# Patient Record
Sex: FEMALE | Race: WHITE | NOT HISPANIC OR LATINO | Employment: OTHER | URBAN - METROPOLITAN AREA
[De-identification: names, ages, dates, MRNs, and addresses within clinical notes are randomized per-mention and may not be internally consistent; named-entity substitution may affect disease eponyms.]

---

## 2017-03-29 ENCOUNTER — HOSPITAL ENCOUNTER (OUTPATIENT)
Dept: LAB | Facility: MEDICAL CENTER | Age: 77
End: 2017-03-29
Attending: FAMILY MEDICINE
Payer: COMMERCIAL

## 2017-03-29 PROCEDURE — 80053 COMPREHEN METABOLIC PANEL: CPT

## 2017-03-29 PROCEDURE — 85025 COMPLETE CBC W/AUTO DIFF WBC: CPT

## 2017-03-29 PROCEDURE — 82306 VITAMIN D 25 HYDROXY: CPT

## 2017-03-29 PROCEDURE — 84480 ASSAY TRIIODOTHYRONINE (T3): CPT

## 2017-03-29 PROCEDURE — 36415 COLL VENOUS BLD VENIPUNCTURE: CPT

## 2017-03-29 PROCEDURE — 84436 ASSAY OF TOTAL THYROXINE: CPT

## 2017-03-29 PROCEDURE — 84443 ASSAY THYROID STIM HORMONE: CPT

## 2017-03-29 PROCEDURE — 80061 LIPID PANEL: CPT

## 2017-03-30 LAB
25(OH)D3 SERPL-MCNC: 99 NG/ML (ref 30–100)
ALBUMIN SERPL BCP-MCNC: 3.8 G/DL (ref 3.2–4.9)
ALBUMIN/GLOB SERPL: 1.2 G/DL
ALP SERPL-CCNC: 70 U/L (ref 30–99)
ALT SERPL-CCNC: 17 U/L (ref 2–50)
ANION GAP SERPL CALC-SCNC: 10 MMOL/L (ref 0–11.9)
AST SERPL-CCNC: 26 U/L (ref 12–45)
BASOPHILS # BLD AUTO: 0.07 K/UL (ref 0–0.12)
BASOPHILS NFR BLD AUTO: 1.1 % (ref 0–1.8)
BILIRUB SERPL-MCNC: 0.7 MG/DL (ref 0.1–1.5)
BUN SERPL-MCNC: 9 MG/DL (ref 8–22)
CALCIUM SERPL-MCNC: 9.5 MG/DL (ref 8.5–10.5)
CHLORIDE SERPL-SCNC: 102 MMOL/L (ref 96–112)
CHOLEST SERPL-MCNC: 134 MG/DL (ref 100–199)
CO2 SERPL-SCNC: 25 MMOL/L (ref 20–33)
CREAT SERPL-MCNC: 0.51 MG/DL (ref 0.5–1.4)
EOSINOPHIL # BLD: 0.15 K/UL (ref 0–0.51)
EOSINOPHIL NFR BLD AUTO: 2.4 % (ref 0–6.9)
ERYTHROCYTE [DISTWIDTH] IN BLOOD BY AUTOMATED COUNT: 46.3 FL (ref 35.9–50)
GLOBULIN SER CALC-MCNC: 3.2 G/DL (ref 1.9–3.5)
GLUCOSE SERPL-MCNC: 120 MG/DL (ref 65–99)
HCT VFR BLD AUTO: 46.1 % (ref 37–47)
HDLC SERPL-MCNC: 61 MG/DL
HGB BLD-MCNC: 15.5 G/DL (ref 12–16)
IMM GRANULOCYTES # BLD AUTO: 0.02 K/UL (ref 0–0.11)
IMM GRANULOCYTES NFR BLD AUTO: 0.3 % (ref 0–0.9)
LDLC SERPL CALC-MCNC: 53 MG/DL
LYMPHOCYTES # BLD: 2.2 K/UL (ref 1–4.8)
LYMPHOCYTES NFR BLD AUTO: 35.2 % (ref 22–41)
MCH RBC QN AUTO: 32.4 PG (ref 27–33)
MCHC RBC AUTO-ENTMCNC: 33.6 G/DL (ref 33.6–35)
MCV RBC AUTO: 96.2 FL (ref 81.4–97.8)
MONOCYTES # BLD: 0.71 K/UL (ref 0–0.85)
MONOCYTES NFR BLD AUTO: 11.4 % (ref 0–13.4)
NEUTROPHILS # BLD: 3.1 K/UL (ref 2–7.15)
NEUTROPHILS NFR BLD AUTO: 49.6 % (ref 44–72)
NRBC # BLD AUTO: 0 K/UL
NRBC BLD-RTO: 0 /100 WBC
PLATELET # BLD AUTO: 223 K/UL (ref 164–446)
PMV BLD AUTO: 11.8 FL (ref 9–12.9)
POTASSIUM SERPL-SCNC: 3.5 MMOL/L (ref 3.6–5.5)
PROT SERPL-MCNC: 7 G/DL (ref 6–8.2)
RBC # BLD AUTO: 4.79 M/UL (ref 4.2–5.4)
SODIUM SERPL-SCNC: 137 MMOL/L (ref 135–145)
T3 SERPL-MCNC: 99.9 NG/DL (ref 60–181)
T4 SERPL-MCNC: 10.4 UG/DL (ref 4–12)
TRIGL SERPL-MCNC: 101 MG/DL (ref 0–149)
TSH SERPL DL<=0.005 MIU/L-ACNC: 0.33 UIU/ML (ref 0.3–3.7)
WBC # BLD AUTO: 6.3 K/UL (ref 4.8–10.8)

## 2017-06-27 ENCOUNTER — HOSPITAL ENCOUNTER (OUTPATIENT)
Dept: LAB | Facility: MEDICAL CENTER | Age: 77
End: 2017-06-27
Attending: FAMILY MEDICINE
Payer: COMMERCIAL

## 2017-06-27 LAB
ALBUMIN SERPL BCP-MCNC: 3.7 G/DL (ref 3.2–4.9)
ALBUMIN/GLOB SERPL: 1.1 G/DL
ALP SERPL-CCNC: 54 U/L (ref 30–99)
ALT SERPL-CCNC: 16 U/L (ref 2–50)
ANION GAP SERPL CALC-SCNC: 9 MMOL/L (ref 0–11.9)
AST SERPL-CCNC: 23 U/L (ref 12–45)
BASOPHILS # BLD AUTO: 1.1 % (ref 0–1.8)
BASOPHILS # BLD: 0.07 K/UL (ref 0–0.12)
BILIRUB SERPL-MCNC: 0.9 MG/DL (ref 0.1–1.5)
BUN SERPL-MCNC: 9 MG/DL (ref 8–22)
CALCIUM SERPL-MCNC: 9.2 MG/DL (ref 8.5–10.5)
CHLORIDE SERPL-SCNC: 103 MMOL/L (ref 96–112)
CO2 SERPL-SCNC: 26 MMOL/L (ref 20–33)
CREAT SERPL-MCNC: 0.62 MG/DL (ref 0.5–1.4)
EOSINOPHIL # BLD AUTO: 0.41 K/UL (ref 0–0.51)
EOSINOPHIL NFR BLD: 6.2 % (ref 0–6.9)
ERYTHROCYTE [DISTWIDTH] IN BLOOD BY AUTOMATED COUNT: 48.5 FL (ref 35.9–50)
GFR SERPL CREATININE-BSD FRML MDRD: >60 ML/MIN/1.73 M 2
GLOBULIN SER CALC-MCNC: 3.4 G/DL (ref 1.9–3.5)
GLUCOSE SERPL-MCNC: 114 MG/DL (ref 65–99)
HCT VFR BLD AUTO: 45.9 % (ref 37–47)
HGB BLD-MCNC: 15.1 G/DL (ref 12–16)
IMM GRANULOCYTES # BLD AUTO: 0.01 K/UL (ref 0–0.11)
IMM GRANULOCYTES NFR BLD AUTO: 0.2 % (ref 0–0.9)
INR PPP: 1.13 (ref 0.87–1.13)
LYMPHOCYTES # BLD AUTO: 2.4 K/UL (ref 1–4.8)
LYMPHOCYTES NFR BLD: 36 % (ref 22–41)
MCH RBC QN AUTO: 31.9 PG (ref 27–33)
MCHC RBC AUTO-ENTMCNC: 32.9 G/DL (ref 33.6–35)
MCV RBC AUTO: 96.8 FL (ref 81.4–97.8)
MONOCYTES # BLD AUTO: 0.88 K/UL (ref 0–0.85)
MONOCYTES NFR BLD AUTO: 13.2 % (ref 0–13.4)
NEUTROPHILS # BLD AUTO: 2.89 K/UL (ref 2–7.15)
NEUTROPHILS NFR BLD: 43.3 % (ref 44–72)
NRBC # BLD AUTO: 0 K/UL
NRBC BLD AUTO-RTO: 0 /100 WBC
PLATELET # BLD AUTO: 181 K/UL (ref 164–446)
PMV BLD AUTO: 12.2 FL (ref 9–12.9)
POTASSIUM SERPL-SCNC: 3.8 MMOL/L (ref 3.6–5.5)
PROT SERPL-MCNC: 7.1 G/DL (ref 6–8.2)
PROTHROMBIN TIME: 14.9 SEC (ref 12–14.6)
RBC # BLD AUTO: 4.74 M/UL (ref 4.2–5.4)
SODIUM SERPL-SCNC: 138 MMOL/L (ref 135–145)
WBC # BLD AUTO: 6.7 K/UL (ref 4.8–10.8)

## 2017-06-27 PROCEDURE — 80053 COMPREHEN METABOLIC PANEL: CPT

## 2017-06-27 PROCEDURE — 36415 COLL VENOUS BLD VENIPUNCTURE: CPT

## 2017-06-27 PROCEDURE — 85025 COMPLETE CBC W/AUTO DIFF WBC: CPT

## 2017-06-27 PROCEDURE — 85610 PROTHROMBIN TIME: CPT | Mod: GA

## 2018-06-06 ENCOUNTER — HOSPITAL ENCOUNTER (OUTPATIENT)
Facility: MEDICAL CENTER | Age: 78
End: 2018-06-06
Attending: OPHTHALMOLOGY | Admitting: OPHTHALMOLOGY
Payer: COMMERCIAL

## 2018-06-06 VITALS
WEIGHT: 186.29 LBS | HEART RATE: 92 BPM | HEIGHT: 61 IN | BODY MASS INDEX: 35.17 KG/M2 | TEMPERATURE: 98 F | SYSTOLIC BLOOD PRESSURE: 144 MMHG | RESPIRATION RATE: 18 BRPM | DIASTOLIC BLOOD PRESSURE: 74 MMHG | OXYGEN SATURATION: 97 %

## 2018-06-06 LAB — EKG IMPRESSION: NORMAL

## 2018-06-06 PROCEDURE — 160036 HCHG PACU - EA ADDL 30 MINS PHASE I: Performed by: OPHTHALMOLOGY

## 2018-06-06 PROCEDURE — 93010 ELECTROCARDIOGRAM REPORT: CPT | Performed by: INTERNAL MEDICINE

## 2018-06-06 PROCEDURE — 160048 HCHG OR STATISTICAL LEVEL 1-5: Performed by: OPHTHALMOLOGY

## 2018-06-06 PROCEDURE — 99153 MOD SED SAME PHYS/QHP EA: CPT | Performed by: OPHTHALMOLOGY

## 2018-06-06 PROCEDURE — 93005 ELECTROCARDIOGRAM TRACING: CPT | Performed by: OPHTHALMOLOGY

## 2018-06-06 PROCEDURE — 700101 HCHG RX REV CODE 250

## 2018-06-06 PROCEDURE — 500855 HCHG NEEDLE, IRRIG CYSTOTOME 27G: Performed by: OPHTHALMOLOGY

## 2018-06-06 PROCEDURE — 501749 HCHG SHELL REV 276: Performed by: OPHTHALMOLOGY

## 2018-06-06 PROCEDURE — 502240 HCHG MISC OR SUPPLY RC 0272: Performed by: OPHTHALMOLOGY

## 2018-06-06 PROCEDURE — 160002 HCHG RECOVERY MINUTES (STAT): Performed by: OPHTHALMOLOGY

## 2018-06-06 PROCEDURE — 99152 MOD SED SAME PHYS/QHP 5/>YRS: CPT | Performed by: OPHTHALMOLOGY

## 2018-06-06 PROCEDURE — 500558 HCHG EYE SHIELD W/GARTER (FOX): Performed by: OPHTHALMOLOGY

## 2018-06-06 PROCEDURE — 160035 HCHG PACU - 1ST 60 MINS PHASE I: Performed by: OPHTHALMOLOGY

## 2018-06-06 PROCEDURE — 700111 HCHG RX REV CODE 636 W/ 250 OVERRIDE (IP)

## 2018-06-06 PROCEDURE — 501539 HCHG TIP, PHACO: Performed by: OPHTHALMOLOGY

## 2018-06-06 PROCEDURE — 160029 HCHG SURGERY MINUTES - 1ST 30 MINS LEVEL 4: Performed by: OPHTHALMOLOGY

## 2018-06-06 DEVICE — IMPLANTABLE DEVICE: Type: IMPLANTABLE DEVICE | Site: EYE | Status: FUNCTIONAL

## 2018-06-06 RX ORDER — PHENYLEPHRINE HYDROCHLORIDE 25 MG/ML
SOLUTION/ DROPS OPHTHALMIC
Status: COMPLETED
Start: 2018-06-06 | End: 2018-06-06

## 2018-06-06 RX ORDER — AMOXICILLIN 500 MG
CAPSULE ORAL
COMMUNITY

## 2018-06-06 RX ORDER — LEVOTHYROXINE SODIUM 112 UG/1
112 TABLET ORAL
COMMUNITY

## 2018-06-06 RX ORDER — MOXIFLOXACIN 5 MG/ML
SOLUTION/ DROPS OPHTHALMIC
Status: DISCONTINUED | OUTPATIENT
Start: 2018-06-06 | End: 2018-06-06 | Stop reason: HOSPADM

## 2018-06-06 RX ORDER — LANOLIN ALCOHOL/MO/W.PET/CERES
400 CREAM (GRAM) TOPICAL DAILY
COMMUNITY

## 2018-06-06 RX ORDER — ANASTROZOLE 1 MG/1
1 TABLET ORAL DAILY
COMMUNITY

## 2018-06-06 RX ORDER — FLURBIPROFEN SODIUM 0.3 MG/ML
SOLUTION/ DROPS OPHTHALMIC
Status: COMPLETED
Start: 2018-06-06 | End: 2018-06-06

## 2018-06-06 RX ORDER — CYCLOPENTOLATE HYDROCHLORIDE 10 MG/ML
SOLUTION/ DROPS OPHTHALMIC
Status: COMPLETED
Start: 2018-06-06 | End: 2018-06-06

## 2018-06-06 RX ORDER — LIDOCAINE HYDROCHLORIDE 10 MG/ML
INJECTION, SOLUTION EPIDURAL; INFILTRATION; INTRACAUDAL; PERINEURAL
Status: DISCONTINUED | OUTPATIENT
Start: 2018-06-06 | End: 2018-06-06 | Stop reason: HOSPADM

## 2018-06-06 RX ORDER — EZETIMIBE AND SIMVASTATIN 10; 20 MG/1; MG/1
1 TABLET ORAL DAILY
COMMUNITY

## 2018-06-06 RX ORDER — TETRACAINE HYDROCHLORIDE 5 MG/ML
SOLUTION OPHTHALMIC
Status: DISCONTINUED | OUTPATIENT
Start: 2018-06-06 | End: 2018-06-06 | Stop reason: HOSPADM

## 2018-06-06 RX ORDER — HYDROCHLOROTHIAZIDE 25 MG/1
25 TABLET ORAL DAILY
COMMUNITY

## 2018-06-06 RX ORDER — VENLAFAXINE 75 MG/1
75 TABLET ORAL DAILY
COMMUNITY

## 2018-06-06 RX ORDER — DIAZEPAM 10 MG/1
10 TABLET ORAL EVERY 6 HOURS PRN
COMMUNITY

## 2018-06-06 RX ORDER — SODIUM CHLORIDE, SODIUM LACTATE, POTASSIUM CHLORIDE, CALCIUM CHLORIDE 600; 310; 30; 20 MG/100ML; MG/100ML; MG/100ML; MG/100ML
INJECTION, SOLUTION INTRAVENOUS CONTINUOUS
Status: DISCONTINUED | OUTPATIENT
Start: 2018-06-06 | End: 2018-06-06 | Stop reason: HOSPADM

## 2018-06-06 RX ORDER — MOXIFLOXACIN 5 MG/ML
SOLUTION/ DROPS OPHTHALMIC
Status: COMPLETED
Start: 2018-06-06 | End: 2018-06-06

## 2018-06-06 RX ORDER — TETRACAINE HYDROCHLORIDE 5 MG/ML
SOLUTION OPHTHALMIC
Status: COMPLETED
Start: 2018-06-06 | End: 2018-06-06

## 2018-06-06 RX ORDER — POTASSIUM CHLORIDE 600 MG/1
8 CAPSULE, EXTENDED RELEASE ORAL DAILY
COMMUNITY

## 2018-06-06 RX ORDER — PROPRANOLOL HYDROCHLORIDE 20 MG/1
20 TABLET ORAL DAILY
COMMUNITY

## 2018-06-06 RX ORDER — METHION/INOS/CHOL BT/B COM/LIV 110MG-86MG
CAPSULE ORAL
COMMUNITY

## 2018-06-06 RX ORDER — CHOLECALCIFEROL (VITAMIN D3) 125 MCG
1000 CAPSULE ORAL DAILY
COMMUNITY

## 2018-06-06 RX ORDER — HYDROCODONE BITARTRATE AND ACETAMINOPHEN 10; 325 MG/1; MG/1
1-2 TABLET ORAL EVERY 6 HOURS PRN
COMMUNITY

## 2018-06-06 RX ADMIN — SODIUM CHLORIDE, SODIUM LACTATE, POTASSIUM CHLORIDE, CALCIUM CHLORIDE: 600; 310; 30; 20 INJECTION, SOLUTION INTRAVENOUS at 11:15

## 2018-06-06 RX ADMIN — PHENYLEPHRINE HYDROCHLORIDE 1 DROP: 2.5 SOLUTION/ DROPS OPHTHALMIC at 09:45

## 2018-06-06 RX ADMIN — FLURBIPROFEN SODIUM 1 DROP: 0.3 SOLUTION/ DROPS OPHTHALMIC at 09:45

## 2018-06-06 RX ADMIN — TETRACAINE HYDROCHLORIDE 1 DROP: 5 SOLUTION OPHTHALMIC at 10:00

## 2018-06-06 RX ADMIN — MOXIFLOXACIN HYDROCHLORIDE 1 DROP: 5 SOLUTION/ DROPS OPHTHALMIC at 09:45

## 2018-06-06 RX ADMIN — CYCLOPENTOLATE HYDROCHLORIDE 1 DROP: 10 SOLUTION/ DROPS OPHTHALMIC at 10:00

## 2018-06-06 ASSESSMENT — PAIN SCALES - GENERAL
PAINLEVEL_OUTOF10: 0

## 2018-06-06 NOTE — DISCHARGE INSTRUCTIONS
HOME CARE INSTRUCTIONS FOR CATARACT SURGERY    ACTIVITY: Rest and take it easy for the first 24 hours. We strongly suggest that a responsible adult remain with you during that time. It is normal to feel sleepy. We encourage you to not do anything that requires balance, judgment or coordination. Be extra careful when walking (with a dilated eye, it is easier to trip and fall).     FOR 24 HOURS, DO NOT:       Drive, operate machinery or run household appliances.        Drink beer or alcoholic beverages.        Make important decisions or sign legal documents.     DIET: To avoid nausea, slowly advance diet as tolerated, avoiding spicy or greasy foods for the first meal.     MEDICATIONS: Resume taking daily medication. You may take Tylenol for mild discomfort, if needed.     SURGICAL DRESSING: Eye shield as instructed by your doctor. Dark glasses should be worn while in the sunlight.     Follow your Physician's instruction Sheet. Eye Kit Given    A follow-up appointment is scheduled with your doctor tomorrow at 9:00 AM.     You should call 911 if you develop problems with breathing or chest pain.  You should CALL YOUR PHYSICIAN if you develop: Sharp stabbing pain or sudden change in vision in your operative eye. If you are unable to contact your doctor or the surgical center, you should go to the nearest emergency room or urgent care center. Physician's telephone # [phone]    If any questions arise, call your doctor. If your doctor is not available, please feel free to call Same Day Surgery at [phone]. You can also call the [facility] Hotline open 24 hours/day, 7 days/week and speak to a nurse at [phone] or [phone].     I acknowledge receipt and understanding of these Home Care Instructions.    ______________________________     _______________________________            Signature of Patient / Responsible Adult                                                       RN Signature    A registered nurse may call  you a few days after your surgery to see how you are doing.   You may also receive a survey in the mail within the next two weeks and we ask that you take a few moments to complete and return the survey. Our goal is to provide you with very good care and we value your comments. Thank you for choosing [facility].

## 2018-06-06 NOTE — OR NURSING
1229 Arrives per cart to PACU, accompanied by OR staff. Patient is sleepy, O2 on per nasal cannula, monitors applied.  Handoff report received from anesthesia and OR nursing personnel.     1315 Up to bathroom with assist and voided. Settled in recliner and returned to bay. Friend in to see.  Patient denies pain, states has some scratchiness in R eye, shield intact.  Sipping water and soda without nausea.    1350 Discharge instructions reviewed with patient and her friend Leny. They verbalized understanding. Patient discharged per wheelchair with belongings and discharge instructions. Friend in accompaniment.

## 2018-06-06 NOTE — OP REPORT
"DATE OF SERVICE:  06/06/2018    PREOPERATIVE DIAGNOSIS:  Cataract, right eye.    POSTOPERATIVE DIAGNOSIS:  Cataract, right eye.    PROCEDURE PERFORMED:  Phacoemulsification with posterior chamber intraocular   lens implant, right eye.    ANESTHESIA:  Topical with monitored anesthesia care.    ANESTHESIOLOGIST:  Robby Linares MD    HISTORY:  This patient is a 77-year-old woman who complains that her cataract   in the right eye needs to come out.  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    She states that she cannot see small print at all.  She must use a hand   magnifier.  She cannot see the phone book.  She does not drive after dark   because everything has a lot of glare \"everything is blurry, object are not   clear.\"  Her pseudophakic eye she states is much better.  Best corrected   vision in the right eye is 20/30-1.  However, this is with approximately 2   diopters of secondary myopia due to the cataracts.  The lens is graded at 4+   nuclear sclerosis.  Near vision with correction is 20/30.  This is; however,   quite a dense cataract.  The patient has seen a DVD regarding the risks and   complications of cataract surgery.  She has signed informed consent and wishes   to proceed.    DESCRIPTION OF PROCEDURE:  The patient was brought to the pre-anesthesia area   after having taken nonsteroidal anti-inflammatory drops in the operative eye   for 3 days prior to surgery.  In the holding area, she received topical   dilating drops, flurbiprofen and Vigamox.  On call to the operating room, the   patient was taken into the operating suite, positioned on the table, prepped   and draped in the usual sterile manner for eye surgery on the right eye.    Sterile drapes were opened, a wirelid speculum was inserted; 1%   preservative-free lidocaine was applied to the cornea and anterior chamber   paracentesis was placed at the 12 o'clock limbus.  1% preservative-free   lidocaine and Viscoat were instilled into the anterior " chamber.  A temporal   clear corneal wound was created using a 3-mm keratome.  Capsulorrhexis,   hydrodissection, hydrodelineation were performed.  The nucleus was   phacoemulsified with a CDE of 22.76.  Cortical cleanup was completed.  Provisc   was used to polish posterior capsule and open the capsular bag.  A Snyder   capsule polisher was used to polish the anterior capsule.  An Alex AcrySof   model SN60WF power 22.0 diopter, 1 piece acrylic intraocular lens, serial   #[id number] was called for, inspected, placed into the injector, injected   into the capsular bag and positioned.  Anterior stromal hydration of the   wounds was performed.  Viscoelastic was aspirated.  Miostat was instilled into   the anterior chamber.  The wounds were checked and found to be watertight.    Sterile drapes were removed.  A 5% Betadine on a Weck-Dina was used to swab the   inferior and superior fornices and the lid margins.  A drop of Vigamox was   placed on the eye and a shield was placed over the eye.  The patient was taken   to the recovery room in good condition.  There were no operative   complications.       ____________________________________     MD MEERA ALCOCER / ANUM    DD:  06/06/2018 12:34:55  DT:  06/06/2018 13:27:07    D#:  [id number]  Job#:  [id number]

## 2018-09-10 ENCOUNTER — HOSPITAL ENCOUNTER (EMERGENCY)
Facility: MEDICAL CENTER | Age: 78
End: 2018-09-10
Attending: EMERGENCY MEDICINE
Payer: COMMERCIAL

## 2018-09-10 ENCOUNTER — APPOINTMENT (OUTPATIENT)
Dept: RADIOLOGY | Facility: MEDICAL CENTER | Age: 78
End: 2018-09-10
Attending: EMERGENCY MEDICINE
Payer: COMMERCIAL

## 2018-09-10 VITALS
RESPIRATION RATE: 18 BRPM | DIASTOLIC BLOOD PRESSURE: 60 MMHG | WEIGHT: 174.38 LBS | TEMPERATURE: 98.1 F | HEIGHT: 62 IN | SYSTOLIC BLOOD PRESSURE: 137 MMHG | HEART RATE: 85 BPM | OXYGEN SATURATION: 94 % | BODY MASS INDEX: 32.09 KG/M2

## 2018-09-10 DIAGNOSIS — S93.491S SPRAIN OF ANTERIOR TALOFIBULAR LIGAMENT OF RIGHT ANKLE, SEQUELA: ICD-10-CM

## 2018-09-10 PROCEDURE — 73610 X-RAY EXAM OF ANKLE: CPT | Mod: RT

## 2018-09-10 PROCEDURE — 73560 X-RAY EXAM OF KNEE 1 OR 2: CPT | Mod: LT

## 2018-09-10 PROCEDURE — 99284 EMERGENCY DEPT VISIT MOD MDM: CPT

## 2018-09-10 ASSESSMENT — PAIN SCALES - GENERAL: PAINLEVEL_OUTOF10: 8

## 2018-09-11 NOTE — ED NOTES
Assumed care of pt in wheelchair. X-ray s ordered per protocol. oce applied. Await erp. Lateral swelling noted to rt ankle. No bruising

## 2018-09-11 NOTE — ED NOTES
.Pt D/C to home. VSS. D/C instructions given to patient. Pt verbalizes understanding. Pt leaves ED with no acute changes, complaints or concerns. Pt out to waiting room via wheelchair and all belongings.

## 2018-09-11 NOTE — ED PROVIDER NOTES
"ED Provider Note    CHIEF COMPLAINT   Chief Complaint   Patient presents with   • Ankle Pain     hurt ankle while getting out of a recliner 3 hours ago.        HPI   Sarah Ferrell is a 77 y.o. female who presents with ankle pain.  She was getting out of a lounge chair in her legs got tied up she twisted her right ankle just prior to presentation no other injuries.    REVIEW OF SYSTEMS   See HPI for further details.      PAST MEDICAL HISTORY   Past Medical History:   Diagnosis Date   • Arthritis     knees   • Cancer (HCC)     breast   • Cataract    • Disorder of thyroid    • Hypertension    • Pain     knee arthritis       FAMILY HISTORY  History reviewed. No pertinent family history.    SOCIAL HISTORY  Social History     Social History   • Marital status:      Spouse name: N/A   • Number of children: N/A   • Years of education: N/A     Social History Main Topics   • Smoking status: Former Smoker     Packs/day: 0.50     Types: Cigarettes     Quit date: 6/6/1985   • Smokeless tobacco: Never Used   • Alcohol use Yes      Comment: 3 a month   • Drug use: No   • Sexual activity: Not on file     Other Topics Concern   • Not on file     Social History Narrative   • No narrative on file       SURGICAL HISTORY  Past Surgical History:   Procedure Laterality Date   • CATARACT PHACO WITH IOL Right 6/6/2018    Procedure: CATARACT PHACO WITH IOL;  Surgeon: Coco Ansari M.D.;  Location: SURGERY SAME DAY [facility];  Service: Ophthalmology       CURRENT MEDICATIONS   Home Medications    **Home medications have not yet been reviewed for this encounter**         ALLERGIES   No Known Allergies    PHYSICAL EXAM  VITAL SIGNS: Pulse 84   Temp 36.3 °C (97.3 °F)   Resp 16   Ht 1.575 m (5' 2\")   Wt 79.1 kg (174 lb 6.1 oz)   SpO2 90%   BMI 31.90 kg/m²   Constitutional: Patient is alert and oriented x3 in no distress   Cardiovascular: Normal heart rate, Normal rhythm, No murmurs, No rubs, No gallops.   Thorax & Lungs: " Normal breath sounds, No respiratory distress, No wheezing,   Skin: Warm, Dry, No erythema, No rash.   Extremities:, Swelling in the anterior tibial talar ligament area just anterior to the tip of the lateral malleolus she also has some distal fibular tenderness no proximal fibular tenderness no medial malleolar tenderness and no foot or heel tenderness  Neurologic: Normal motor sensation  Vascular: Good capillary refill      RADIOLOGY/PROCEDURES  DX-KNEE 2- LEFT   Final Result      1.  LEFT knee prosthesis in place.   2.  No fracture or dislocation.   3.  Diffuse anterior soft tissue swelling.      DX-ANKLE 3+ VIEWS RIGHT   Final Result      1.  Lateral swelling of RIGHT ankle.   2.  No fracture or dislocation.            COURSE & MEDICAL DECISION MAKING  Pertinent Labs & Imaging studies reviewed. (See chart for details)  Patient has ankle sprain.  She has crutches at home.  I am placing her in a sugar tong air splint and she will follow-up with her orthopedist tomorrow    FINAL IMPRESSION  Ankle sprain      2.   3.      Electronically signed by: James Cook, 9/10/2018 7:00 PM

## 2018-09-11 NOTE — ED NOTES
".  Chief Complaint   Patient presents with   • Ankle Pain     hurt ankle while getting out of a recliner 3 hours ago.      .Pulse 84   Temp 36.3 °C (97.3 °F)   Resp 16   Ht 1.575 m (5' 2\")   Wt 79.1 kg (174 lb 6.1 oz)   SpO2 90%   BMI 31.90 kg/m²     "

## 2019-12-02 ENCOUNTER — HOSPITAL ENCOUNTER (OUTPATIENT)
Dept: LAB | Facility: MEDICAL CENTER | Age: 79
End: 2019-12-02
Attending: INTERNAL MEDICINE
Payer: COMMERCIAL

## 2019-12-02 LAB
ANION GAP SERPL CALC-SCNC: 10 MMOL/L (ref 0–11.9)
BUN SERPL-MCNC: 10 MG/DL (ref 8–22)
CALCIUM SERPL-MCNC: 9.6 MG/DL (ref 8.5–10.5)
CHLORIDE SERPL-SCNC: 97 MMOL/L (ref 96–112)
CO2 SERPL-SCNC: 29 MMOL/L (ref 20–33)
CREAT SERPL-MCNC: 0.68 MG/DL (ref 0.5–1.4)
GLUCOSE SERPL-MCNC: 138 MG/DL (ref 65–99)
MAGNESIUM SERPL-MCNC: 1.7 MG/DL (ref 1.5–2.5)
PHOSPHATE SERPL-MCNC: 4.4 MG/DL (ref 2.5–4.5)
POTASSIUM SERPL-SCNC: 3.3 MMOL/L (ref 3.6–5.5)
SODIUM SERPL-SCNC: 136 MMOL/L (ref 135–145)

## 2019-12-02 PROCEDURE — 36415 COLL VENOUS BLD VENIPUNCTURE: CPT

## 2019-12-02 PROCEDURE — 80048 BASIC METABOLIC PNL TOTAL CA: CPT

## 2019-12-02 PROCEDURE — 84439 ASSAY OF FREE THYROXINE: CPT

## 2019-12-02 PROCEDURE — 84443 ASSAY THYROID STIM HORMONE: CPT

## 2019-12-02 PROCEDURE — 84100 ASSAY OF PHOSPHORUS: CPT

## 2019-12-02 PROCEDURE — 83735 ASSAY OF MAGNESIUM: CPT

## 2019-12-03 LAB
T4 FREE SERPL-MCNC: 1.17 NG/DL (ref 0.53–1.43)
TSH SERPL DL<=0.005 MIU/L-ACNC: 1.63 UIU/ML (ref 0.38–5.33)

## 2021-01-14 DIAGNOSIS — Z23 NEED FOR VACCINATION: ICD-10-CM

## (undated) DEVICE — CATHETER IV 20 GA X 1-1/4 ---SURG.& SDS ONLY--- (50EA/BX)

## (undated) DEVICE — CARTRIDGE MONARCH C - (10EA/BX)

## (undated) DEVICE — KIT, EYE POST-OP FOR PHACOS

## (undated) DEVICE — SUCTION INSTRUMENT YANKAUER BULBOUS TIP W/O VENT (50EA/CA)

## (undated) DEVICE — SHIELD OPTH AL GRTR CVR FOX (50EA/BX)

## (undated) DEVICE — LACTATED RINGERS INJ 1000 ML - (14EA/CA 60CA/PF)

## (undated) DEVICE — TIP POLYMER I&A

## (undated) DEVICE — KIT ANESTHESIA W/CIRCUIT & 3/LT BAG W/FILTER (20EA/CA)

## (undated) DEVICE — SENSOR SPO2 NEO LNCS ADHESIVE (20/BX) SEE USER NOTES

## (undated) DEVICE — CON SEDATION EA ADDL 15 MIN

## (undated) DEVICE — TUBING CLEARLINK DUO-VENT - C-FLO (48EA/CA)

## (undated) DEVICE — BLADE 3.0MM ANGLED DBL BEVEL WITH SAFETY (10/CA)

## (undated) DEVICE — SET LEADWIRE 5 LEAD BEDSIDE DISPOSABLE ECG (1SET OF 5/EA)

## (undated) DEVICE — SODIUM CHL IRRIGATION 0.9% 1000ML (12EA/CA)

## (undated) DEVICE — NEEDLE FILTER ASPIRATION 18 GA X 1 1/2 IN (100EA/BX)

## (undated) DEVICE — CON SEDATION/>5 YR 1ST 15 MIN

## (undated) DEVICE — CANISTER SUCTION RIGID RED 1500CC (40EA/CA)

## (undated) DEVICE — TUBE CONNECTING SUCTION - CLEAR PLASTIC STERILE 72 IN (50EA/CA)

## (undated) DEVICE — KNIFE MICROSURGICAL 15 DEGREE GREEN

## (undated) DEVICE — ELECTRODE 850 FOAM ADHESIVE - HYDROGEL RADIOTRNSPRNT (50/PK)

## (undated) DEVICE — PACK BASIC CATARACT - (4/BX)

## (undated) DEVICE — TIP 0.9MM ABS 45 DEG OZIL 12 - (6/BX)

## (undated) DEVICE — NEEDLE CYSTOTOME OPTH VSTC  0.4MM X 16MM - (10/CA)

## (undated) DEVICE — GLOVE BIOGEL SZ 5.5 SURGICAL PF LTX- (50PR/BX 4BX/CA)

## (undated) DEVICE — KIT  I.V. START (100EA/CA)

## (undated) DEVICE — WATER IRRIGATION STERILE 1000ML (12EA/CA)

## (undated) DEVICE — CANISTER SUCTION 3000ML MECHANICAL FILTER AUTO SHUTOFF MEDI-VAC NONSTERILE LF DISP  (40EA/CA)